# Patient Record
Sex: MALE | Race: BLACK OR AFRICAN AMERICAN | NOT HISPANIC OR LATINO | Employment: UNEMPLOYED | URBAN - METROPOLITAN AREA
[De-identification: names, ages, dates, MRNs, and addresses within clinical notes are randomized per-mention and may not be internally consistent; named-entity substitution may affect disease eponyms.]

---

## 2021-02-07 ENCOUNTER — HOSPITAL ENCOUNTER (EMERGENCY)
Facility: HOSPITAL | Age: 31
Discharge: HOME | End: 2021-02-08
Attending: EMERGENCY MEDICINE | Admitting: EMERGENCY MEDICINE
Payer: COMMERCIAL

## 2021-02-07 ENCOUNTER — APPOINTMENT (EMERGENCY)
Dept: CT IMAGING | Facility: HOSPITAL | Age: 31
End: 2021-02-07
Payer: COMMERCIAL

## 2021-02-07 DIAGNOSIS — K52.9 ENTERITIS: ICD-10-CM

## 2021-02-07 DIAGNOSIS — R11.2 NON-INTRACTABLE VOMITING WITH NAUSEA, UNSPECIFIED VOMITING TYPE: ICD-10-CM

## 2021-02-07 DIAGNOSIS — R10.84 GENERALIZED ABDOMINAL PAIN: Primary | ICD-10-CM

## 2021-02-07 DIAGNOSIS — R19.7 DIARRHEA, UNSPECIFIED TYPE: ICD-10-CM

## 2021-02-07 DIAGNOSIS — E86.0 DEHYDRATION: ICD-10-CM

## 2021-02-07 LAB
ALBUMIN SERPL BCP-MCNC: 4.4 G/DL (ref 3.5–5)
ALP SERPL-CCNC: 68 U/L (ref 46–116)
ALT SERPL W P-5'-P-CCNC: 21 U/L (ref 12–78)
AMORPH URATE CRY URNS QL MICRO: ABNORMAL /HPF
AMPHETAMINES SERPL QL SCN: NEGATIVE
ANION GAP SERPL CALCULATED.3IONS-SCNC: 10 MMOL/L (ref 4–13)
AST SERPL W P-5'-P-CCNC: 31 U/L (ref 5–45)
BACTERIA UR QL AUTO: ABNORMAL /HPF
BARBITURATES UR QL: NEGATIVE
BASOPHILS # BLD AUTO: 0.03 THOUSANDS/ΜL (ref 0–0.1)
BASOPHILS NFR BLD AUTO: 0 % (ref 0–1)
BENZODIAZ UR QL: NEGATIVE
BILIRUB SERPL-MCNC: 1.68 MG/DL (ref 0.2–1)
BILIRUB UR QL STRIP: NEGATIVE
BUN SERPL-MCNC: 11 MG/DL (ref 5–25)
CALCIUM SERPL-MCNC: 9.9 MG/DL (ref 8.3–10.1)
CHLORIDE SERPL-SCNC: 103 MMOL/L (ref 100–108)
CLARITY UR: CLEAR
CO2 SERPL-SCNC: 28 MMOL/L (ref 21–32)
COCAINE UR QL: NEGATIVE
COLOR UR: YELLOW
CREAT SERPL-MCNC: 0.83 MG/DL (ref 0.6–1.3)
EOSINOPHIL # BLD AUTO: 0 THOUSAND/ΜL (ref 0–0.61)
EOSINOPHIL NFR BLD AUTO: 0 % (ref 0–6)
ERYTHROCYTE [DISTWIDTH] IN BLOOD BY AUTOMATED COUNT: 12.5 % (ref 11.6–15.1)
GFR SERPL CREATININE-BSD FRML MDRD: 136 ML/MIN/1.73SQ M
GLUCOSE SERPL-MCNC: 124 MG/DL (ref 65–140)
GLUCOSE UR STRIP-MCNC: NEGATIVE MG/DL
HCT VFR BLD AUTO: 43.9 % (ref 36.5–49.3)
HGB BLD-MCNC: 15.6 G/DL (ref 12–17)
HGB UR QL STRIP.AUTO: NEGATIVE
IMM GRANULOCYTES # BLD AUTO: 0.05 THOUSAND/UL (ref 0–0.2)
IMM GRANULOCYTES NFR BLD AUTO: 0 % (ref 0–2)
KETONES UR STRIP-MCNC: ABNORMAL MG/DL
LACTATE SERPL-SCNC: 3.7 MMOL/L (ref 0.5–2)
LACTATE SERPL-SCNC: 4.7 MMOL/L (ref 0.5–2)
LEUKOCYTE ESTERASE UR QL STRIP: NEGATIVE
LIPASE SERPL-CCNC: 29 U/L (ref 73–393)
LYMPHOCYTES # BLD AUTO: 0.66 THOUSANDS/ΜL (ref 0.6–4.47)
LYMPHOCYTES NFR BLD AUTO: 5 % (ref 14–44)
MCH RBC QN AUTO: 32.6 PG (ref 26.8–34.3)
MCHC RBC AUTO-ENTMCNC: 35.5 G/DL (ref 31.4–37.4)
MCV RBC AUTO: 92 FL (ref 82–98)
METHADONE UR QL: NEGATIVE
MONOCYTES # BLD AUTO: 0.76 THOUSAND/ΜL (ref 0.17–1.22)
MONOCYTES NFR BLD AUTO: 6 % (ref 4–12)
MUCOUS THREADS UR QL AUTO: ABNORMAL
NEUTROPHILS # BLD AUTO: 11.3 THOUSANDS/ΜL (ref 1.85–7.62)
NEUTS SEG NFR BLD AUTO: 89 % (ref 43–75)
NITRITE UR QL STRIP: NEGATIVE
NON-SQ EPI CELLS URNS QL MICRO: ABNORMAL /HPF
NRBC BLD AUTO-RTO: 0 /100 WBCS
OPIATES UR QL SCN: NEGATIVE
OXYCODONE+OXYMORPHONE UR QL SCN: NEGATIVE
PCP UR QL: NEGATIVE
PH UR STRIP.AUTO: 8.5 [PH] (ref 4.5–8)
PLATELET # BLD AUTO: 182 THOUSANDS/UL (ref 149–390)
PMV BLD AUTO: 11.9 FL (ref 8.9–12.7)
POTASSIUM SERPL-SCNC: 3.6 MMOL/L (ref 3.5–5.3)
POTASSIUM SERPL-SCNC: 5.6 MMOL/L (ref 3.5–5.3)
PROT SERPL-MCNC: 8.3 G/DL (ref 6.4–8.2)
PROT UR STRIP-MCNC: ABNORMAL MG/DL
RBC # BLD AUTO: 4.78 MILLION/UL (ref 3.88–5.62)
RBC #/AREA URNS AUTO: ABNORMAL /HPF
SODIUM SERPL-SCNC: 141 MMOL/L (ref 136–145)
SP GR UR STRIP.AUTO: 1.01 (ref 1–1.03)
THC UR QL: POSITIVE
UROBILINOGEN UR QL STRIP.AUTO: 0.2 E.U./DL
WBC # BLD AUTO: 12.8 THOUSAND/UL (ref 4.31–10.16)
WBC #/AREA URNS AUTO: ABNORMAL /HPF

## 2021-02-07 PROCEDURE — 80053 COMPREHEN METABOLIC PANEL: CPT | Performed by: NURSE PRACTITIONER

## 2021-02-07 PROCEDURE — 96365 THER/PROPH/DIAG IV INF INIT: CPT

## 2021-02-07 PROCEDURE — 85025 COMPLETE CBC W/AUTO DIFF WBC: CPT | Performed by: NURSE PRACTITIONER

## 2021-02-07 PROCEDURE — 82550 ASSAY OF CK (CPK): CPT | Performed by: NURSE PRACTITIONER

## 2021-02-07 PROCEDURE — 99285 EMERGENCY DEPT VISIT HI MDM: CPT | Performed by: NURSE PRACTITIONER

## 2021-02-07 PROCEDURE — 99284 EMERGENCY DEPT VISIT MOD MDM: CPT

## 2021-02-07 PROCEDURE — 74177 CT ABD & PELVIS W/CONTRAST: CPT

## 2021-02-07 PROCEDURE — 84132 ASSAY OF SERUM POTASSIUM: CPT | Performed by: NURSE PRACTITIONER

## 2021-02-07 PROCEDURE — 83605 ASSAY OF LACTIC ACID: CPT | Performed by: NURSE PRACTITIONER

## 2021-02-07 PROCEDURE — 96375 TX/PRO/DX INJ NEW DRUG ADDON: CPT

## 2021-02-07 PROCEDURE — 87040 BLOOD CULTURE FOR BACTERIA: CPT | Performed by: NURSE PRACTITIONER

## 2021-02-07 PROCEDURE — 36415 COLL VENOUS BLD VENIPUNCTURE: CPT | Performed by: NURSE PRACTITIONER

## 2021-02-07 PROCEDURE — 81001 URINALYSIS AUTO W/SCOPE: CPT

## 2021-02-07 PROCEDURE — 96376 TX/PRO/DX INJ SAME DRUG ADON: CPT

## 2021-02-07 PROCEDURE — 80307 DRUG TEST PRSMV CHEM ANLYZR: CPT | Performed by: NURSE PRACTITIONER

## 2021-02-07 PROCEDURE — 96361 HYDRATE IV INFUSION ADD-ON: CPT

## 2021-02-07 PROCEDURE — 83690 ASSAY OF LIPASE: CPT | Performed by: NURSE PRACTITIONER

## 2021-02-07 PROCEDURE — G1004 CDSM NDSC: HCPCS

## 2021-02-07 RX ORDER — ONDANSETRON 2 MG/ML
4 INJECTION INTRAMUSCULAR; INTRAVENOUS ONCE
Status: COMPLETED | OUTPATIENT
Start: 2021-02-07 | End: 2021-02-07

## 2021-02-07 RX ORDER — SODIUM CHLORIDE 9 MG/ML
125 INJECTION, SOLUTION INTRAVENOUS CONTINUOUS
Status: DISCONTINUED | OUTPATIENT
Start: 2021-02-07 | End: 2021-02-08 | Stop reason: HOSPADM

## 2021-02-07 RX ORDER — DICYCLOMINE HCL 20 MG
20 TABLET ORAL ONCE
Status: COMPLETED | OUTPATIENT
Start: 2021-02-07 | End: 2021-02-07

## 2021-02-07 RX ADMIN — DICYCLOMINE HYDROCHLORIDE 20 MG: 20 TABLET ORAL at 23:38

## 2021-02-07 RX ADMIN — IOHEXOL 100 ML: 350 INJECTION, SOLUTION INTRAVENOUS at 21:51

## 2021-02-07 RX ADMIN — SODIUM CHLORIDE 125 ML/HR: 0.9 INJECTION, SOLUTION INTRAVENOUS at 23:35

## 2021-02-07 RX ADMIN — SODIUM CHLORIDE 1000 ML: 0.9 INJECTION, SOLUTION INTRAVENOUS at 20:25

## 2021-02-07 RX ADMIN — ONDANSETRON 4 MG: 2 INJECTION INTRAMUSCULAR; INTRAVENOUS at 20:25

## 2021-02-07 RX ADMIN — CEFEPIME HYDROCHLORIDE 2000 MG: 2 INJECTION, POWDER, FOR SOLUTION INTRAVENOUS at 22:09

## 2021-02-07 RX ADMIN — ONDANSETRON 4 MG: 2 INJECTION INTRAMUSCULAR; INTRAVENOUS at 23:36

## 2021-02-07 RX ADMIN — SODIUM CHLORIDE 1000 ML: 0.9 INJECTION, SOLUTION INTRAVENOUS at 21:11

## 2021-02-07 NOTE — Clinical Note
Justen Garcia was seen and treated in our emergency department on 2/7/2021  Diagnosis:     Raisa Roman  may return to work on return date  He may return on this date: 02/10/2021         If you have any questions or concerns, please don't hesitate to call        KAREN Caputo    ______________________________           _______________          _______________  Hospital Representative                              Date                                Time

## 2021-02-08 VITALS
SYSTOLIC BLOOD PRESSURE: 90 MMHG | DIASTOLIC BLOOD PRESSURE: 62 MMHG | TEMPERATURE: 97.5 F | WEIGHT: 99.87 LBS | RESPIRATION RATE: 14 BRPM | OXYGEN SATURATION: 100 % | HEART RATE: 59 BPM

## 2021-02-08 LAB — CK SERPL-CCNC: 145 U/L (ref 39–308)

## 2021-02-08 RX ORDER — DICYCLOMINE HCL 20 MG
20 TABLET ORAL EVERY 6 HOURS PRN
Qty: 24 TABLET | Refills: 0 | Status: SHIPPED | OUTPATIENT
Start: 2021-02-08

## 2021-02-08 RX ORDER — ONDANSETRON 4 MG/1
4 TABLET, FILM COATED ORAL EVERY 8 HOURS PRN
Qty: 20 TABLET | Refills: 0 | Status: SHIPPED | OUTPATIENT
Start: 2021-02-08

## 2021-02-08 NOTE — ED PROVIDER NOTES
History  Chief Complaint   Patient presents with    Abdominal Pain     Generalized abdominal pain today, nausea, vomiting  Alcohol consumption last night  No known fever  28 y/o male in ED with vomiting and abd pain  Reports he had "a couple cups" of hard liquor last night  Reports vomiting about 5x today, with nausea and abd pain  He has tried drinking ginger ale and eating crackers today, but is unable to keep anything down  Denies headache, dizziness, weakness  He denies RDA  He states that he feels as though he is going to start having diarrhea as well  History provided by:  Medical records and patient   used: No        None       History reviewed  No pertinent past medical history  History reviewed  No pertinent surgical history  History reviewed  No pertinent family history  I have reviewed and agree with the history as documented  E-Cigarette/Vaping     E-Cigarette/Vaping Substances     Social History     Tobacco Use    Smoking status: Current Every Day Smoker     Packs/day: 0 50    Smokeless tobacco: Never Used   Substance Use Topics    Alcohol use: Yes     Comment: ocassional     Drug use: Never       Review of Systems   Gastrointestinal: Positive for abdominal pain, diarrhea, nausea and vomiting  Negative for abdominal distention and blood in stool  All other systems reviewed and are negative  Physical Exam  Physical Exam  Vitals signs and nursing note reviewed  Constitutional:       General: He is not in acute distress  Appearance: He is normal weight  He is not ill-appearing, toxic-appearing or diaphoretic  Comments: Appears uncomfortable   Pt is holding his stomach moaning    HENT:      Head: Normocephalic and atraumatic  Mouth/Throat:      Mouth: Mucous membranes are moist    Eyes:      Pupils: Pupils are equal, round, and reactive to light  Cardiovascular:      Rate and Rhythm: Regular rhythm  Bradycardia present        Heart sounds: Normal heart sounds  Pulmonary:      Effort: Pulmonary effort is normal       Breath sounds: Normal breath sounds  Abdominal:      General: Abdomen is flat  Bowel sounds are normal       Palpations: Abdomen is soft  Tenderness: There is generalized abdominal tenderness  Skin:     General: Skin is warm and dry  Capillary Refill: Capillary refill takes less than 2 seconds  Comments: Bilateral 5th fingernails are extremely long; all other digits are trimmed short   Neurological:      General: No focal deficit present  Mental Status: He is alert and oriented to person, place, and time     Psychiatric:         Mood and Affect: Mood normal          Behavior: Behavior normal          Vital Signs  ED Triage Vitals [02/07/21 2004]   Temperature Pulse Respirations Blood Pressure SpO2   97 5 °F (36 4 °C) (!) 53 18 113/78 100 %      Temp Source Heart Rate Source Patient Position - Orthostatic VS BP Location FiO2 (%)   Oral Monitor Sitting Right arm --      Pain Score       5           Vitals:    02/07/21 2004 02/07/21 2210 02/08/21 0028   BP: 113/78 108/69 90/62   Pulse: (!) 53 57 59   Patient Position - Orthostatic VS: Sitting Lying Lying         Visual Acuity      ED Medications  Medications   sodium chloride 0 9 % infusion (0 mL/hr Intravenous Stopped 2/8/21 0124)   sodium chloride 0 9 % bolus 1,000 mL (0 mL Intravenous Stopped 2/7/21 2141)   ondansetron (ZOFRAN) injection 4 mg (4 mg Intravenous Given 2/7/21 2025)   sodium chloride 0 9 % bolus 1,000 mL (0 mL Intravenous Stopped 2/7/21 2215)   cefepime (MAXIPIME) 2 g/50 mL dextrose IVPB (0 mg Intravenous Stopped 2/7/21 2339)   iohexol (OMNIPAQUE) 350 MG/ML injection (MULTI-DOSE) 100 mL (100 mL Intravenous Given 2/7/21 2151)   dicyclomine (BENTYL) tablet 20 mg (20 mg Oral Given 2/7/21 2338)   ondansetron (ZOFRAN) injection 4 mg (4 mg Intravenous Given 2/7/21 2336)       Diagnostic Studies  Results Reviewed     Procedure Component Value Units Date/Time    Lactic acid, plasma [[id number]]     Lab Status: No result Specimen: Blood     CK Total with Reflex CKMB [[id number]]  (Normal) Collected: 02/07/21 2205    Lab Status: Final result Specimen: Blood from Arm, Left Updated: 02/08/21 0008     Total  U/L     Lactic acid 2 Hours [[id number]]  (Abnormal) Collected: 02/07/21 2231    Lab Status: Final result Specimen: Blood from Arm, Right Updated: 02/07/21 2300     LACTIC ACID 3 7 mmol/L     Narrative:      Result may be elevated if tourniquet was used during collection  Rapid drug screen, urine [[id number]]  (Abnormal) Collected: 02/07/21 2138    Lab Status: Final result Specimen: Urine, Clean Catch Updated: 02/07/21 2247     Amph/Meth UR Negative     Barbiturate Ur Negative     Benzodiazepine Urine Negative     Cocaine Urine Negative     Methadone Urine Negative     Opiate Urine Negative     PCP Ur Negative     THC Urine Positive     Oxycodone Urine Negative    Narrative:      Presumptive report  If requested, specimen will be sent to reference lab for confirmation  FOR MEDICAL PURPOSES ONLY  IF CONFIRMATION NEEDED PLEASE CONTACT THE LAB WITHIN 5 DAYS      Drug Screen Cutoff Levels:  AMPHETAMINE/METHAMPHETAMINES  1000 ng/mL  BARBITURATES     200 ng/mL  BENZODIAZEPINES     200 ng/mL  COCAINE      300 ng/mL  METHADONE      300 ng/mL  OPIATES      300 ng/mL  PHENCYCLIDINE     25 ng/mL  THC       50 ng/mL  OXYCODONE      100 ng/mL    Potassium [[id number]]  (Normal) Collected: 02/07/21 2205    Lab Status: Final result Specimen: Blood from Arm, Left Updated: 02/07/21 2221     Potassium 3 6 mmol/L     Urine Microscopic [[id number]]  (Abnormal) Collected: 02/07/21 2135    Lab Status: Final result Specimen: Urine, Clean Catch Updated: 02/07/21 2219     RBC, UA 1-2 /hpf      WBC, UA 0-1 /hpf      Epithelial Cells Occasional /hpf      Bacteria, UA Occasional /hpf      AMORPH URATES Occasional /hpf      MUCUS THREADS Occasional    Blood culture #1 [[id number]] Collected: 02/07/21 2205    Lab Status: In process Specimen: Blood from Arm, Right Updated: 02/07/21 2210    Blood culture #2 [[id number]] Collected: 02/07/21 2205    Lab Status: In process Specimen: Blood from Arm, Left Updated: 02/07/21 2210    Urine Macroscopic, POC [[id number]]  (Abnormal) Collected: 02/07/21 2135    Lab Status: Final result Specimen: Urine Updated: 02/07/21 2136     Color, UA Yellow     Clarity, UA Clear     pH, UA 8 5     Leukocytes, UA Negative     Nitrite, UA Negative     Protein,  (2+) mg/dl      Glucose, UA Negative mg/dl      Ketones, UA 15 (1+) mg/dl      Urobilinogen, UA 0 2 E U /dl      Bilirubin, UA Negative     Blood, UA Negative     Specific Gravity, UA 1 015    Narrative:      CLINITEK RESULT    Lactic acid [[id number]]  (Abnormal) Collected: 02/07/21 2029    Lab Status: Final result Specimen: Blood from Arm, Right Updated: 02/07/21 2123     LACTIC ACID 4 7 mmol/L     Narrative:      Result may be elevated if tourniquet was used during collection      Comprehensive metabolic panel [[id number]]  (Abnormal) Collected: 02/07/21 2029    Lab Status: Final result Specimen: Blood from Arm, Right Updated: 02/07/21 2103     Sodium 141 mmol/L      Potassium 5 6 mmol/L      Chloride 103 mmol/L      CO2 28 mmol/L      ANION GAP 10 mmol/L      BUN 11 mg/dL      Creatinine 0 83 mg/dL      Glucose 124 mg/dL      Calcium 9 9 mg/dL      AST 31 U/L      ALT 21 U/L      Alkaline Phosphatase 68 U/L      Total Protein 8 3 g/dL      Albumin 4 4 g/dL      Total Bilirubin 1 68 mg/dL      eGFR 136 ml/min/1 73sq m     Narrative:      [facility] guidelines for Chronic Kidney Disease (CKD):     Stage 1 with normal or high GFR (GFR > 90 mL/min/1 73 square meters)    Stage 2 Mild CKD (GFR = 60-89 mL/min/1 73 square meters)    Stage 3A Moderate CKD (GFR = 45-59 mL/min/1 73 square meters)    Stage 3B Moderate CKD (GFR = 30-44 mL/min/1 73 square meters)    Stage 4 Severe CKD (GFR = 15-29 mL/min/1 73 square meters)    Stage 5 End Stage CKD (GFR <15 mL/min/1 73 square meters)  Note: GFR calculation is accurate only with a steady state creatinine    Lipase [[id number]]  (Abnormal) Collected: 02/07/21 2029    Lab Status: Final result Specimen: Blood from Arm, Right Updated: 02/07/21 2103     Lipase 29 u/L     CBC and differential [[id number]]  (Abnormal) Collected: 02/07/21 2029    Lab Status: Final result Specimen: Blood from Arm, Right Updated: 02/07/21 2035     WBC 12 80 Thousand/uL      RBC 4 78 Million/uL      Hemoglobin 15 6 g/dL      Hematocrit 43 9 %      MCV 92 fL      MCH 32 6 pg      MCHC 35 5 g/dL      RDW 12 5 %      MPV 11 9 fL      Platelets 763 Thousands/uL      nRBC 0 /100 WBCs      Neutrophils Relative 89 %      Immat GRANS % 0 %      Lymphocytes Relative 5 %      Monocytes Relative 6 %      Eosinophils Relative 0 %      Basophils Relative 0 %      Neutrophils Absolute 11 30 Thousands/µL      Immature Grans Absolute 0 05 Thousand/uL      Lymphocytes Absolute 0 66 Thousands/µL      Monocytes Absolute 0 76 Thousand/µL      Eosinophils Absolute 0 00 Thousand/µL      Basophils Absolute 0 03 Thousands/µL                  CT abdomen pelvis with contrast   Final Result by Hannah Wilkinson DO (02/07 2230)      Diffuse thickening of scattered loops of small bowel likely representing enteritis  Appendix is not visualized and cannot be adequately evaluated  I personally discussed this study with Sree Mckee on 2/7/2021 at 10:27 PM                      Workstation performed: [id number]                    Procedures  Procedures         ED Course  ED Course as of Feb 08 0253   Yan Dorsey Feb 07, 2021 2040 WBC(!): 12 80   2111 Pt gave permission for NP to speak with mother regarding diagnosis and labs  Informed mother that we are still waiting for labs and urine results  Pt was able to walk to  and give urine  Will give IVF #2 and reassess         2126 LA 4 7 - pt need 30 ml/kg IVF - pt is 45 3 x 30 = 1359 ml IVF  Pt has been ordered 2 liters IVF         2126 LACTIC ACID(!!): 4 7   2127 CT A/P ordered  2146 Will get BC and give 2 gm cefepime to meed Sepsis Criteria  2151 CT abdomen pelvis with contrast   2203 K 5 6 - hemolyzed - will redraw  2204 Urine reveals dehydration        2222 Potassium: 3 6   2228 Dr Estefanía Chowdhury called and states enlarged liver most likely from periportal edema due to IVF  Thickened small bowel possible enteritis  Unable to see appendix but doubt appendicitis due to given story  Waiting on official read       2236 IMPRESSION:     Diffuse thickening of scattered loops of small bowel likely representing enteritis      Appendix is not visualized and cannot be adequately evaluated  2236 Will reassess  2239 Pt is seated upright on edge of bed  C/o nausea and abd pain  States he feels like he needs to have a bowel movement but can't       2304 + THC - pt denied any RDA  LA 3 7 will give  ml/hr have pt trial po challenge  May need to admit  Will repeat LA in 2 hours  2311 Pt is seen sitting on the edge of the bed and states he is feeling better after second dose of zofran  2312 Will add CK to make sure pt is not in rhabdomyolysis as LA is high - has decreased by 1  After 2 liters IVF  Mon Feb 08, 2021   0113 Pt is refusing repeat LA  It is trending down  He has been able to keep po down  Stable for discharge  Elevated LA most likely from enteritis and vomiting  Will prescribe zofan, bentyl, gatorade and follow  up  Strict return instructions given to pt        0120 Pt states he is feeling much better  He has kept food down  Pt actually appears improved than when he first came in          pt ambulatory in room and stable for discharge    BP 90/62 (BP Location: Right arm)   Pulse 59   Temp 97 5 °F (36 4 °C) (Oral)   Resp 14   Wt 45 3 kg (99 lb 13 9 oz)   SpO2 100%                       Initial Sepsis Screening [address] [place] [address] Name 02/07/21 2126                Is the patient's history suggestive of a new or worsening infection?  --        Suspected source of infection  acute abdominal infection  -KF        Are two or more of the following signs & symptoms of infection both present and new to the patient? No  -KF        Indicate SIRS criteria  Leukocytosis (WBC > 52960 IJL)  -KF        If the answer is yes to both questions, suspicion of sepsis is present  --        If severe sepsis is present AND tissue hypoperfusion perists in the hour after fluid resuscitation or lactate > 4, the patient meets criteria for SEPTIC SHOCK  --        Are any of the following organ dysfunction criteria present within 6 hours of suspected infection and SIRS criteria that are NOT considered to be chronic conditions? (!) Yes  -KF        Organ dysfunction  Lactate >/equal 4 0 mmol/L  -KF        Date of presentation of severe sepsis  --        Time of presentation of severe sepsis  --        Tissue hypoperfusion persists in the hour after crystalloid fluid administration, evidenced, by either:  --        Was hypotension present within one hour of the conclusion of crystalloid fluid administration?  --        Date of presentation of septic shock  --        Time of presentation of septic shock  --          User Key  (r) = Recorded By, (t) = Taken By, (c) = Cosigned By    [id number]  Name Provider Type    KF Ventura Adrian, [address]  Nurse Practitioner          SBIRT 22yo+      Most Recent Value   SBIRT (25 yo +)   In order to provide better care to our patients, we are screening all of our patients for alcohol and drug use  Would it be okay to ask you these screening questions?   No Filed at: 02/07/2021 2013                    [facility]  Number of Diagnoses or Management Options  Diagnosis management comments: N/V and abd pain secondary to excessive alcohol intake    DDX:  Gastritis  Gastroenteritis  Hang over from excessive ETOH use  With draw from substance Plan:  Labs, urine  IVF, zofran  IV  Monitor reassess           Amount and/or Complexity of Data Reviewed  Clinical lab tests: ordered and reviewed  Tests in the radiology section of CPT®: ordered and reviewed  Review and summarize past medical records: yes        Disposition  Final diagnoses:   Generalized abdominal pain   Diarrhea, unspecified type   Non-intractable vomiting with nausea, unspecified vomiting type   Dehydration   Enteritis     Time reflects when diagnosis was documented in both MDM as applicable and the Disposition within this note     Time User Action Codes Description Comment    2/7/2021 10:04 PM Gertrude Marielle Add [R10 84] Generalized abdominal pain     2/7/2021 10:04 PM Gertrude Marielle Add [R11 2] Nausea and vomiting     2/7/2021 10:04 PM [place] [R19 7] Diarrhea     2/7/2021 10:04 PM Tamara Lush [R11 2] Nausea and vomiting     2/7/2021 10:04 PM Gertrude Marielle Remove [R19 7] Diarrhea     2/7/2021 10:04 PM [place] [R19 7] Diarrhea, unspecified type     2/7/2021 10:04 PM Gertrude Marielle Add [R11 2] Non-intractable vomiting with nausea, unspecified vomiting type     2/7/2021 10:04 PM [place] [E86 0] Dehydration     2/7/2021 11:34 PM Gertrude Marielle Add [K52 9] Enteritis       ED Disposition     ED Disposition Condition Date/Time Comment    Discharge Stable Mon Feb 8, 2021  1:14 AM Yamila Bishop discharge to home/self care              Follow-up Information     Follow up With Specialties Details Why Contact Info Additional 823 [facility] Emergency Medicine Go to  If symptoms worsen [facility] [id number]  112 [facility] Emergency Department, [address] [place] [address] , [place], [place], 151 [facility] Call in 2 days To establish care [address], [address] [facility] [id number]  30 [address], [address], [address] [place], [place], [address]          Discharge Medication List as of 2/8/2021  1:17 AM      START taking these medications    Details   dicyclomine (BENTYL) 20 mg tablet Take 1 tablet (20 mg total) by mouth every 6 (six) hours as needed (abdominal pain and diarrhea), Starting Mon 2/8/2021, Normal      ondansetron (ZOFRAN) 4 mg tablet Take 1 tablet (4 mg total) by mouth every 8 (eight) hours as needed for nausea or vomiting, Starting Mon 2/8/2021, Normal           No discharge procedures on file      PDMP Review     None          ED Provider  Electronically Signed by           Christ Luna  02/08/21 5446

## 2021-02-08 NOTE — DISCHARGE INSTRUCTIONS
You have enteritis    You are prescribed zofran for nausea and vomiting  You have been prescribed bentyl for diarrhea and abdominal pain  You are to stay hydrated with gatorade  Follow up with your PCP  Light diet x 24-48 hours  bananas, rice, applesauce and toast

## 2021-02-08 NOTE — ED NOTES
Advised pt of needed urine sample, Patient stated he does not want to go right now because of pain       Cristel Harrison  02/07/21 2030

## 2021-02-13 LAB
BACTERIA BLD CULT: ABNORMAL
BACTERIA BLD CULT: NORMAL
GRAM STN SPEC: ABNORMAL

## 2021-02-13 NOTE — RESULT ENCOUNTER NOTE
Attempted to call x 2 to discuss blood culture results and see how patient is feeling  Phone number has been restricted or is unavailable at this time  Will send a letter for patient to call for results

## 2022-08-23 ENCOUNTER — HOSPITAL ENCOUNTER (EMERGENCY)
Facility: HOSPITAL | Age: 32
Discharge: HOME | End: 2022-08-23
Attending: EMERGENCY MEDICINE
Payer: COMMERCIAL

## 2022-08-23 VITALS
DIASTOLIC BLOOD PRESSURE: 95 MMHG | HEART RATE: 71 BPM | RESPIRATION RATE: 15 BRPM | SYSTOLIC BLOOD PRESSURE: 119 MMHG | TEMPERATURE: 98.6 F | WEIGHT: 100.97 LBS | OXYGEN SATURATION: 97 %

## 2022-08-23 DIAGNOSIS — R25.2 MUSCLE CRAMPS: ICD-10-CM

## 2022-08-23 DIAGNOSIS — E86.0 DEHYDRATION: Primary | ICD-10-CM

## 2022-08-23 PROCEDURE — 99283 EMERGENCY DEPT VISIT LOW MDM: CPT

## 2022-08-23 PROCEDURE — 99284 EMERGENCY DEPT VISIT MOD MDM: CPT | Performed by: EMERGENCY MEDICINE

## 2022-08-24 NOTE — ED PROVIDER NOTES
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 to drink throughout the course of the day  Reports that he was having muscle spasms and was feeling somewhat weak and dizzy  He drank some water quickly and had one episode of vomiting  He denies any other acute issues or complaints and states that at this 0 8 he is feeling better  Fatigue  Severity:  Moderate  Onset quality:  Gradual  Timing:  Constant  Progression:  Waxing and waning  Chronicity:  New  Context: dehydration    Relieved by:  Nothing  Worsened by:  Nothing  Ineffective treatments:  None tried  Associated symptoms: dizziness, nausea and vomiting    Associated symptoms: no abdominal pain, no aphasia, no arthralgias, no ataxia, no chest pain, no cough, no diarrhea, no difficulty walking, no drooling, no dysphagia, no dysuria, no numbness in extremities, no falls, no fever, no foul-smelling urine, no frequency, no headaches, no hematochezia, no lethargy, no loss of consciousness, no myalgias, no near-syncope, no seizures, no sensory-motor deficit, no shortness of breath, no stroke symptoms, no syncope, no urgency and no vision change        None       Past Medical History:   Diagnosis Date    Sickle cell trait ([facility] [address] )        History reviewed  No pertinent surgical history  History reviewed  No pertinent family history  I have reviewed and agree with the history as documented      E-Cigarette/Vaping    E-Cigarette Use Never User      E-Cigarette/Vaping Substances     Social History     Tobacco Use    Smoking status: Current Every Day Smoker     Packs/day: 0 25     Types: Cigarettes    Smokeless tobacco: Never Used   Vaping Use    Vaping Use: Never used   Substance Use Topics    Alcohol use: Not Currently    Drug use: Yes     Types: Marijuana       Review of Systems   Constitutional: Positive for fatigue  Negative for activity change, chills and fever  HENT: Negative  Negative for congestion, drooling, postnasal drip, rhinorrhea, sinus pain, sore throat and trouble swallowing  Eyes: Negative  Respiratory: Negative  Negative for cough and shortness of breath  Cardiovascular: Negative for chest pain, syncope and near-syncope  Gastrointestinal: Positive for nausea and vomiting  Negative for abdominal pain, constipation, diarrhea, dysphagia and hematochezia  Endocrine: Negative  Genitourinary: Negative  Negative for dysuria, frequency and urgency  Musculoskeletal: Negative  Negative for arthralgias, back pain, falls and myalgias  Skin: Negative  Allergic/Immunologic: Negative  Neurological: Positive for dizziness  Negative for seizures, loss of consciousness and headaches  Hematological: Negative  Psychiatric/Behavioral: Negative  All other systems reviewed and are negative  Physical Exam  Physical Exam  Vitals and nursing note reviewed  Constitutional:       General: He is not in acute distress  Appearance: Normal appearance  He is well-developed  He is not ill-appearing, toxic-appearing or diaphoretic  HENT:      Head: Normocephalic and atraumatic  Right Ear: External ear normal       Left Ear: External ear normal       Nose: Nose normal       Mouth/Throat:      Mouth: Mucous membranes are moist       Pharynx: Oropharynx is clear  Eyes:      Conjunctiva/sclera: Conjunctivae normal       Pupils: Pupils are equal, round, and reactive to light  Cardiovascular:      Rate and Rhythm: Normal rate and regular rhythm  Heart sounds: Normal heart sounds  Pulmonary:      Effort: Pulmonary effort is normal  No respiratory distress        Breath sounds: Normal breath sounds  Abdominal:      General: Bowel sounds are normal  There is no distension  Palpations: Abdomen is soft  Tenderness: There is no abdominal tenderness  There is no guarding  Musculoskeletal:         General: Normal range of motion  Cervical back: Neck supple  No rigidity  Right lower leg: No edema  Left lower leg: No edema  Skin:     General: Skin is warm and dry  Capillary Refill: Capillary refill takes less than 2 seconds  Neurological:      General: No focal deficit present  Mental Status: He is alert and oriented to person, place, and time  Psychiatric:         Mood and Affect: Mood normal          Behavior: Behavior normal          Vital Signs  ED Triage Vitals [08/23/22 2233]   Temperature Pulse Respirations Blood Pressure SpO2   98 6 °F (37 °C) (!) 116 15 119/95 97 %      Temp Source Heart Rate Source Patient Position - Orthostatic VS BP Location FiO2 (%)   Oral Monitor Lying Left arm --      Pain Score       --           Vitals:    08/23/22 2233 08/23/22 2304   BP: 119/95    Pulse: (!) 116 71   Patient Position - Orthostatic VS: Lying          Visual Acuity      ED Medications  Medications - No data to display    Diagnostic Studies  Results Reviewed     None                 No orders to display              Procedures  Procedures         ED Course  ED Course as of 08/23/22 2310   Tue Aug 23, 2022   2310 Tolerating po intake  No additional sx  Will d/c  Advised of ways to prevent dehydration  SBIRT 20yo+    Flowsheet Row Most Recent Value   SBIRT (23 yo +)    In order to provide better care to our patients, we are screening all of our patients for alcohol and drug use  Would it be okay to ask you these screening questions? Yes Filed at: 08/23/2022 2236   Initial Alcohol Screen: US AUDIT-C     1  How often do you have a drink containing alcohol? 0 Filed at: 08/23/2022 2236   2   How many drinks containing alcohol do you have on a typical day you are drinking? 0 Filed at: 08/23/2022 2236   3a  Male UNDER 65: How often do you have five or more drinks on one occasion? 0 Filed at: 08/23/2022 2236   3b  FEMALE Any Age, or MALE 65+: How often do you have 4 or more drinks on one occassion? 0 Filed at: 08/23/2022 2236   Audit-C Score 0 Filed at: 08/23/2022 2236                    MDM    Disposition  Final diagnoses:   Dehydration   Muscle cramps     Time reflects when diagnosis was documented in both MDM as applicable and the Disposition within this note     Time User Action Codes Description Comment    8/23/2022 10:34 PM Jeremy Daybailey Add [E86 0] Dehydration     8/23/2022 10:35 PM Jeremy Childress Add [R25 2] Muscle cramps       ED Disposition     ED Disposition   Discharge    Condition   Stable    Date/Time   Tue Aug 23, 2022 10:34 PM    Comment   [place] discharge to home/self care  Follow-up Information    None         Patient's Medications   Discharge Prescriptions    No medications on file       No discharge procedures on file      PDMP Review     None          ED Provider  Electronically Signed by           Kaitlyn Hill DO  08/23/22 0145